# Patient Record
Sex: MALE | Employment: FULL TIME | URBAN - METROPOLITAN AREA
[De-identification: names, ages, dates, MRNs, and addresses within clinical notes are randomized per-mention and may not be internally consistent; named-entity substitution may affect disease eponyms.]

---

## 2017-02-07 PROCEDURE — 87491 CHLMYD TRACH DNA AMP PROBE: CPT | Performed by: FAMILY MEDICINE

## 2017-02-07 PROCEDURE — 87591 N.GONORRHOEAE DNA AMP PROB: CPT | Performed by: FAMILY MEDICINE

## 2020-01-11 ENCOUNTER — HOSPITAL ENCOUNTER (EMERGENCY)
Facility: HOSPITAL | Age: 40
Discharge: HOME | End: 2020-01-11
Attending: EMERGENCY MEDICINE
Payer: COMMERCIAL

## 2020-01-11 VITALS
OXYGEN SATURATION: 97 % | WEIGHT: 160 LBS | TEMPERATURE: 99 F | DIASTOLIC BLOOD PRESSURE: 76 MMHG | RESPIRATION RATE: 17 BRPM | HEIGHT: 65 IN | HEART RATE: 68 BPM | SYSTOLIC BLOOD PRESSURE: 117 MMHG | BODY MASS INDEX: 26.66 KG/M2

## 2020-01-11 DIAGNOSIS — M25.551 RIGHT HIP PAIN: Primary | ICD-10-CM

## 2020-01-11 PROCEDURE — 99283 EMERGENCY DEPT VISIT LOW MDM: CPT

## 2020-01-11 PROCEDURE — 96372 THER/PROPH/DIAG INJ SC/IM: CPT

## 2020-01-11 RX ORDER — KETOROLAC TROMETHAMINE 10 MG/1
10 TABLET, FILM COATED ORAL EVERY 6 HOURS PRN
Qty: 30 TABLET | Refills: 0 | Status: SHIPPED | OUTPATIENT
Start: 2020-01-11 | End: 2020-01-18

## 2020-01-11 RX ORDER — KETOROLAC TROMETHAMINE 30 MG/ML
60 INJECTION, SOLUTION INTRAMUSCULAR; INTRAVENOUS ONCE
Status: COMPLETED | OUTPATIENT
Start: 2020-01-11 | End: 2020-01-11

## 2020-01-12 NOTE — ED INITIAL ASSESSMENT (HPI)
Onset Thursday with right hip pain, worse last night. Went to  urgent care, had XRAY completed, diagnosed with trochanteric bursitis. Pain is progressive, no relief with motrin.

## 2020-01-12 NOTE — ED PROVIDER NOTES
Patient Seen in: [facility] Emergency Department      History   Patient presents with:  Lower Extremity Injury    Stated Complaint: right hip pain/strain    HPI    Patient is a 13-year-old gentleman coming in with right hip pain.   Started on Wednesd Pulse 68   Temp 98.7 °F (37.1 °C) (Temporal)   Resp 17   Ht 165.1 cm (5' 5\")   Wt 72.6 kg   SpO2 97%   BMI 26.63 kg/m²         Physical Exam  Vitals signs and nursing note reviewed. Constitutional:       General: He is not in acute distress.      Yaa Jimenez available. FINDINGS/  IMPRESSION:    No acute fracture. No malalignment. No osteoarthritis.     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

## 2021-11-26 ENCOUNTER — APPOINTMENT (OUTPATIENT)
Dept: GENERAL RADIOLOGY | Age: 41
End: 2021-11-26
Attending: EMERGENCY MEDICINE
Payer: COMMERCIAL

## 2021-11-26 ENCOUNTER — HOSPITAL ENCOUNTER (OUTPATIENT)
Age: 41
Discharge: HOME | End: 2021-11-26
Attending: EMERGENCY MEDICINE
Payer: COMMERCIAL

## 2021-11-26 VITALS
RESPIRATION RATE: 18 BRPM | OXYGEN SATURATION: 100 % | TEMPERATURE: 98 F | DIASTOLIC BLOOD PRESSURE: 75 MMHG | HEART RATE: 66 BPM | SYSTOLIC BLOOD PRESSURE: 146 MMHG

## 2021-11-26 DIAGNOSIS — M25.511 ACUTE PAIN OF RIGHT SHOULDER: Primary | ICD-10-CM

## 2021-11-26 PROCEDURE — 99213 OFFICE O/P EST LOW 20 MIN: CPT

## 2021-11-26 PROCEDURE — 73030 X-RAY EXAM OF SHOULDER: CPT | Performed by: EMERGENCY MEDICINE

## 2021-11-26 NOTE — ED INITIAL ASSESSMENT (HPI)
Right arm and shoulder pain for about 2 months believed to be from lifting weights. Received Pfizer covid booster 3 days ago and has worse pain. Numbness and tingling to fingers yesterday. Taking Ibuprofen with some relief.

## 2021-11-26 NOTE — ED PROVIDER NOTES
Patient Seen in: [facility]      History   Patient presents with:  Arm Pain    Stated Complaint: shoulder pain     Subjective:   HPI    The patient is a 21-year-old male with no significant past medical history presents now with right shoulde Temp src Temporal   SpO2 100 %   O2 Device None (Room air)       Current:/75   Pulse 66   Temp 97.8 °F (36.6 °C) (Temporal)   Resp 18   SpO2 100%         Physical Exam    Constitutional: Well-developed well-nourished in no acute distress  Head: Nor Medication List

## 2023-09-18 ENCOUNTER — NURSE ONLY (OUTPATIENT)
Dept: INTERNAL MEDICINE CLINIC | Facility: HOSPITAL | Age: 43
End: 2023-09-18
Attending: EMERGENCY MEDICINE

## 2023-09-18 DIAGNOSIS — Z00.00 WELLNESS EXAMINATION: Primary | ICD-10-CM

## 2023-09-18 LAB
MUV IGG SER IA-ACNC: 53.7 AU/ML (ref 11–?)
VZV IGG SER IA-ACNC: 668.5 (ref 165–?)

## 2023-09-18 PROCEDURE — 86735 MUMPS ANTIBODY: CPT

## 2023-09-18 PROCEDURE — 86480 TB TEST CELL IMMUN MEASURE: CPT

## 2023-09-18 PROCEDURE — 86765 RUBEOLA ANTIBODY: CPT

## 2023-09-18 PROCEDURE — 86787 VARICELLA-ZOSTER ANTIBODY: CPT

## 2023-09-19 LAB
M TB IFN-G CD4+ T-CELLS BLD-ACNC: 0.04 IU/ML
M TB TUBERC IFN-G BLD QL: NEGATIVE
M TB TUBERC IGNF/MITOGEN IGNF CONTROL: >10 IU/ML
QFT TB1 AG MINUS NIL: 0.13 IU/ML
QFT TB2 AG MINUS NIL: 0.07 IU/ML

## 2024-02-24 ENCOUNTER — HOSPITAL ENCOUNTER (OUTPATIENT)
Age: 44
Discharge: HOME | End: 2024-02-24
Payer: COMMERCIAL

## 2024-02-24 ENCOUNTER — APPOINTMENT (OUTPATIENT)
Dept: GENERAL RADIOLOGY | Age: 44
End: 2024-02-24
Attending: PHYSICIAN ASSISTANT
Payer: COMMERCIAL

## 2024-02-24 VITALS
BODY MASS INDEX: 28 KG/M2 | TEMPERATURE: 97 F | WEIGHT: 165 LBS | SYSTOLIC BLOOD PRESSURE: 124 MMHG | OXYGEN SATURATION: 97 % | RESPIRATION RATE: 18 BRPM | HEART RATE: 68 BPM | DIASTOLIC BLOOD PRESSURE: 55 MMHG

## 2024-02-24 DIAGNOSIS — M79.672 PAIN OF LEFT HEEL: Primary | ICD-10-CM

## 2024-02-24 DIAGNOSIS — M76.62 ACHILLES TENDINITIS OF LEFT LOWER EXTREMITY: ICD-10-CM

## 2024-02-24 PROCEDURE — 73610 X-RAY EXAM OF ANKLE: CPT | Performed by: PHYSICIAN ASSISTANT

## 2024-02-24 PROCEDURE — 99213 OFFICE O/P EST LOW 20 MIN: CPT | Performed by: PHYSICIAN ASSISTANT

## 2024-02-24 NOTE — ED PROVIDER NOTES
Patient Seen in: [facility]      History     Chief Complaint   Patient presents with    Heel Pain     Stated Complaint: heel pain    Subjective:   HPI  Srinivas Angel is 43 year old male presents with acute left heel pain x 3 day due to injury while performing squats at the gym .  Patient reports pain localized to left heel at the insertion point of the achilles region.  Non radiating, worsened w/ ambulation, weight bearing, and palpation.  No numbness, tingling, parasthesias, skin/ color/ temperature/ sensory changes reported.  No medications taken prior to arrival.           Objective:   History reviewed. No pertinent past medical history.           History reviewed. No pertinent surgical history.             Social History     Socioeconomic History    Marital status:     Number of children: 3   Tobacco Use    Smoking status: Former     Packs/day: 0.10     Years: 5.00     Additional pack years: 0.00     Total pack years: 0.50     Types: Cigarettes     Quit date: 2016     Years since quittin.0    Smokeless tobacco: Never   Vaping Use    Vaping Use: Never used   Substance and Sexual Activity    Alcohol use: Yes     Comment: occ    Drug use: Never    Sexual activity: Yes     Partners: Female   Other Topics Concern    Exercise Yes    Seat Belt Yes   Social History Narrative    Ages 6/4/2 , children              Review of Systems   All other systems reviewed and are negative.      Positive for stated complaint: heel pain  Other systems are as noted in HPI.  Constitutional and vital signs reviewed.      All other systems reviewed and negative except as noted above.    Physical Exam     ED Triage Vitals [24 0830]   /55   Pulse 68   Resp 18   Temp 97.3 °F (36.3 °C)   Temp src Temporal   SpO2 97 %   O2 Device None (Room air)       Current:/55   Pulse 68   Temp 97.3 °F (36.3 °C) (Temporal)   Resp 18   Wt 74.8 kg   SpO2 97%   BMI 28.32 kg/m²         Physical  Exam  Vitals and nursing note reviewed.   Constitutional:       General: He is not in acute distress.     Appearance: Normal appearance. He is normal weight. He is not ill-appearing, toxic-appearing or diaphoretic.   HENT:      Head: Normocephalic and atraumatic.      Nose: Nose normal.   Eyes:      Extraocular Movements: Extraocular movements intact.      Pupils: Pupils are equal, round, and reactive to light.   Cardiovascular:      Rate and Rhythm: Normal rate.      Pulses: Normal pulses.   Pulmonary:      Effort: Pulmonary effort is normal.      Breath sounds: Normal breath sounds.   Musculoskeletal:         General: Tenderness present. No swelling, deformity or signs of injury. Normal range of motion.      Cervical back: Normal range of motion and neck supple.      Right lower leg: No edema.      Left lower leg: No edema.      Comments: Negative Rizvi test    Skin:     General: Skin is warm and dry.      Capillary Refill: Capillary refill takes less than 2 seconds.      Coloration: Skin is not jaundiced or pale.      Findings: No bruising, erythema, lesion or rash.   Neurological:      General: No focal deficit present.      Mental Status: He is alert and oriented to person, place, and time. Mental status is at baseline.   Psychiatric:         Mood and Affect: Mood normal.         Behavior: Behavior normal.         Thought Content: Thought content normal.         Judgment: Judgment normal.               ED Course   Labs Reviewed - No data to display  XR ANKLE (MIN 3 VIEWS), LEFT (CPT=73610)   Final Result   PROCEDURE:  XR ANKLE (MIN 3 VIEWS), LEFT (CPT=73610)       TECHNIQUE:  Three views were obtained.       COMPARISON:  None.       INDICATIONS:  heel pain.  rule out avulsion fracture       PATIENT STATED HISTORY: (As transcribed by Technologist)  Pt with PMH of    plantar fasciatus, was doing squats with weights at the gym , and started    to feel pain at the heel of his left foot, it has progressively  gotten    worse                              =====   CONCLUSION:  There is no acute fracture or dislocation involving the left    ankle.           LOCATION:  [facility]           Dictated by (CST): Kathleen Sanders MD on 2/24/2024 at 9:02 AM        Finalized by (CST): Kathleen Sanders MD on 2/24/2024 at 9:02 AM           Medications - No data to display  Vitals:    02/24/24 0830   BP: 124/55   Pulse: 68   Resp: 18   Temp: 97.3 °F (36.3 °C)                      MDM                                        Medical Decision Making  43-year-old well-appearing male presents with Achilles tendinitis localized to insertion point of left lower extremity.  No concern for Achilles rupture as there are no palpable deficits along the Achilles region with dorsiflexion of left foot.  Negative Rizvi's test.  Plan  - x ray: left ankle  - OTC: Tylenol 1g po q 6 hours/ prn.  ibuprofen 600mg po q8 hours/ prn.   - RICE Apply a compressive ACE bandage. Rest and elevate the affected painful area. Apply cold compresses intermittently as needed. As pain recedes, begin normal activities slowly as tolerated. Call if symptoms persist.  - explained to patient that if symptoms do not improve that an MRI may be warranted however that will be determined at the discretion of follow up care  - refer to orthopaedics/ PCP  as needed     Amount and/or Complexity of Data Reviewed  Radiology: ordered and independent interpretation performed. Decision-making details documented in ED Course.     Details: No fracture/dislocation of the left ankle based on my independent interpretation        Disposition and Plan     Clinical Impression:  1. Pain of left heel    2. Achilles tendinitis of left lower extremity         Disposition:  Discharge  2/24/2024  9:13 am    Follow-up:  Marianne Licea MD  [address] [place] [id number]  [phone]                Medications Prescribed:  Discharge Medication List as of 2/24/2024  9:15 AM

## 2024-02-24 NOTE — ED INITIAL ASSESSMENT (HPI)
Pt with PMH of plantar fasciatus, was doing squats with weights at the gym , and started to feel pain at the heel of his left foot, it has progressively gotten worse

## 2024-02-29 ENCOUNTER — OFFICE VISIT (OUTPATIENT)
Dept: FAMILY MEDICINE CLINIC | Facility: CLINIC | Age: 44
End: 2024-02-29
Payer: COMMERCIAL

## 2024-02-29 VITALS
RESPIRATION RATE: 20 BRPM | HEIGHT: 64 IN | TEMPERATURE: 97 F | BODY MASS INDEX: 28.17 KG/M2 | SYSTOLIC BLOOD PRESSURE: 102 MMHG | WEIGHT: 165 LBS | DIASTOLIC BLOOD PRESSURE: 58 MMHG | HEART RATE: 72 BPM | OXYGEN SATURATION: 98 %

## 2024-02-29 DIAGNOSIS — J01.00 ACUTE NON-RECURRENT MAXILLARY SINUSITIS: Primary | ICD-10-CM

## 2024-02-29 DIAGNOSIS — Z00.00 HEALTHCARE MAINTENANCE: ICD-10-CM

## 2024-02-29 PROCEDURE — 99214 OFFICE O/P EST MOD 30 MIN: CPT | Performed by: FAMILY MEDICINE

## 2024-02-29 RX ORDER — DOXYCYCLINE 100 MG/1
100 CAPSULE ORAL 2 TIMES DAILY
Qty: 14 CAPSULE | Refills: 0 | Status: SHIPPED | OUTPATIENT
Start: 2024-02-29 | End: 2024-03-07

## 2024-02-29 RX ORDER — BENZONATATE 200 MG/1
200 CAPSULE ORAL 3 TIMES DAILY PRN
Qty: 40 CAPSULE | Refills: 0 | Status: SHIPPED | OUTPATIENT
Start: 2024-02-29

## 2024-02-29 NOTE — PROGRESS NOTES
[facility] Office Note  Chief Complaint:   Chief Complaint   Patient presents with    Cough     Pt c/o sx for about 3 weeks.   Pt sts coughing up yellow mucus.    Chest Congestion    Nasal Congestion       HPI:   This is a 43 year old male coming in for cough congestion sinus pressure the patient states that this has been going on for the last 3 weeks states he has had some yellow mucus production as well.  Denies any fever denies any shortness of breath.      No past medical history on file.  No past surgical history on file.  Social History:  Social History     Socioeconomic History    Marital status:     Number of children: 3   Tobacco Use    Smoking status: Former     Packs/day: 0.10     Years: 5.00     Additional pack years: 0.00     Total pack years: 0.50     Types: Cigarettes     Quit date: 2016     Years since quittin.0    Smokeless tobacco: Never   Vaping Use    Vaping Use: Never used   Substance and Sexual Activity    Alcohol use: Yes     Comment: occ    Drug use: Never    Sexual activity: Yes     Partners: Female   Other Topics Concern    Exercise Yes    Seat Belt Yes   Social History Narrative    Ages 6/4/2 , children     Family History:  Family History   Problem Relation Age of Onset    Diabetes Mother     Cancer Neg     Heart Attack Neg     Stroke Neg      Allergies:  Allergies   Allergen Reactions    Penicillins SHORTNESS OF BREATH     As a child reaction unknown       Current Meds:  Current Outpatient Medications   Medication Sig Dispense Refill    Doxycycline Monohydrate 100 MG Oral Cap Take 1 capsule (100 mg total) by mouth 2 (two) times daily for 7 days. 14 capsule 0    benzonatate 200 MG Oral Cap Take 1 capsule (200 mg total) by mouth 3 (three) times daily as needed for cough. 40 capsule 0    Multiple Vitamins-Minerals (MULTIVITAMIN MEN OR) Take 1 tablet by mouth daily.        Counseling given: Not Answered       REVIEW OF SYSTEMS:   Consitutional: No  fevers, chills, sweats  Eye: No recent visual problems  ENMT: No ear pain positive nasal congestion sore throat  Respiratory: No shortness of breath, positive cough  Cardiovascular: No chest pain palpitations syncope  Gastrointestinal: No nausea vomiting diarrhea  Genitourinary: No hematuria  Hema/Lymph no bruising tendency, swollen lymph glands  Endocrine: Negative for excessive thirst excessive hunger      Medical, surgical, family, and social histories were reviewed      EXAM:   VITALS:   Vitals:    02/29/24 0731   BP: 102/58   Pulse: 72   Resp: 20   Temp: 97 °F (36.1 °C)      GENERAL: well developed, well nourished, in no apparent distress  SKIN: no rashes, no suspicious lesions: Cool and Dry  HEENT: atraumatic, normocephalic, ears and throat are clear right and left maxillary sinus tenderness to palpation  Ears: TM's clear and visible bilaterally, no excess cerumen or erythema.   EYES: Pupils equal round and reactive.  Extraocular motions intact no scleral icterus no injection or drainage  THROAT with erythema 1+ tonsillar hypertrophy no exudate.  Uvula midline airway patent  NECK: Given midline.  No JVD or lymphadenopathy supple nontender no meningeal signs   LUNGS: clear to auscultation sounds equal bilaterally no wheezes rales or rhonchi  CARDIO: Regular rate and rhythm without murmurs gallops or rubs         ASSESSMENT AND PLAN:   1. Acute non-recurrent maxillary sinusitis  - Doxycycline Monohydrate 100 MG Oral Cap; Take 1 capsule (100 mg total) by mouth 2 (two) times daily for 7 days.  Dispense: 14 capsule; Refill: 0  - benzonatate 200 MG Oral Cap; Take 1 capsule (200 mg total) by mouth 3 (three) times daily as needed for cough.  Dispense: 40 capsule; Refill: 0    I did discuss with the patient at this time we will be starting him on doxycycline he was asked to use this twice a day for the next 7 days he was asked to use Tessalon Perles to help with the cough.  He was asked to use Flonase twice a day and  to follow-up if he has any continued symptoms        2. Healthcare maintenance  - CBC With Differential With Platelet; Future  - Comp Metabolic Panel (14); Future  - Lipid Panel; Future  - PSA Total, Screen; Future  - TSH and Free T4; Future  - Triiodothyronine (T3) Total; Future  I did discuss with the patient he need to update blood work you need a CBC CMP free T4 free T3 TSH as well as lipid panel PSA he was also asked to schedule a physical exam so that we could go over the results.    Meds & Refills for this Visit:  Requested Prescriptions     Signed Prescriptions Disp Refills    Doxycycline Monohydrate 100 MG Oral Cap 14 capsule 0     Sig: Take 1 capsule (100 mg total) by mouth 2 (two) times daily for 7 days.    benzonatate 200 MG Oral Cap 40 capsule 0     Sig: Take 1 capsule (200 mg total) by mouth 3 (three) times daily as needed for cough.       Health Maintenance:  Health Maintenance Due   Topic Date Due    Annual Physical  Never done    COVID-19 Vaccine (4 - 2023-24 season) 09/01/2023    Influenza Vaccine (1) 10/01/2023    Annual Depression Screening  01/01/2024       Patient/Caregiver Education: Patient/Caregiver Education: There are no barriers to learning. Medical education done.   Outcome: Patient verbalizes understanding. Patient is notified to call with any questions, complications, allergies, or worsening or changing symptoms.  Patient is to call with any side effects or complications from the treatments as a result of today.     Problem List:  There is no problem list on file for this patient.        No follow-ups on file.     Jose J Stokes MD    Please note that portions of this note may have been completed with a voice recognition program. Efforts were made to edit the dictations but occasionally words are mis-transcribed.

## 2024-03-06 ENCOUNTER — LAB ENCOUNTER (OUTPATIENT)
Dept: LAB | Age: 44
End: 2024-03-06
Attending: FAMILY MEDICINE
Payer: COMMERCIAL

## 2024-03-06 DIAGNOSIS — Z00.00 HEALTHCARE MAINTENANCE: ICD-10-CM

## 2024-03-06 LAB
ALBUMIN SERPL-MCNC: 3.6 G/DL (ref 3.4–5)
ALBUMIN/GLOB SERPL: 0.9 {RATIO} (ref 1–2)
ALP LIVER SERPL-CCNC: 64 U/L
ALT SERPL-CCNC: 47 U/L
ANION GAP SERPL CALC-SCNC: 5 MMOL/L (ref 0–18)
AST SERPL-CCNC: 17 U/L (ref 15–37)
BASOPHILS # BLD AUTO: 0.07 X10(3) UL (ref 0–0.2)
BASOPHILS NFR BLD AUTO: 1.2 %
BILIRUB SERPL-MCNC: 0.5 MG/DL (ref 0.1–2)
BUN BLD-MCNC: 18 MG/DL (ref 9–23)
CALCIUM BLD-MCNC: 8.8 MG/DL (ref 8.5–10.1)
CHLORIDE SERPL-SCNC: 104 MMOL/L (ref 98–112)
CHOLEST SERPL-MCNC: 170 MG/DL (ref ?–200)
CO2 SERPL-SCNC: 26 MMOL/L (ref 21–32)
COMPLEXED PSA SERPL-MCNC: 0.83 NG/ML (ref ?–4)
CREAT BLD-MCNC: 0.95 MG/DL
EGFRCR SERPLBLD CKD-EPI 2021: 102 ML/MIN/1.73M2 (ref 60–?)
EOSINOPHIL # BLD AUTO: 0.17 X10(3) UL (ref 0–0.7)
EOSINOPHIL NFR BLD AUTO: 2.9 %
ERYTHROCYTE [DISTWIDTH] IN BLOOD BY AUTOMATED COUNT: 13.2 %
FASTING PATIENT LIPID ANSWER: YES
FASTING STATUS PATIENT QL REPORTED: YES
GLOBULIN PLAS-MCNC: 4.2 G/DL (ref 2.8–4.4)
GLUCOSE BLD-MCNC: 105 MG/DL (ref 70–99)
HCT VFR BLD AUTO: 44.7 %
HDLC SERPL-MCNC: 33 MG/DL (ref 40–59)
HGB BLD-MCNC: 14.1 G/DL
IMM GRANULOCYTES # BLD AUTO: 0.09 X10(3) UL (ref 0–1)
IMM GRANULOCYTES NFR BLD: 1.5 %
LDLC SERPL CALC-MCNC: 119 MG/DL (ref ?–100)
LYMPHOCYTES # BLD AUTO: 1.75 X10(3) UL (ref 1–4)
LYMPHOCYTES NFR BLD AUTO: 29.8 %
MCH RBC QN AUTO: 26 PG (ref 26–34)
MCHC RBC AUTO-ENTMCNC: 31.5 G/DL (ref 31–37)
MCV RBC AUTO: 82.5 FL
MONOCYTES # BLD AUTO: 0.93 X10(3) UL (ref 0.1–1)
MONOCYTES NFR BLD AUTO: 15.8 %
NEUTROPHILS # BLD AUTO: 2.86 X10 (3) UL (ref 1.5–7.7)
NEUTROPHILS # BLD AUTO: 2.86 X10(3) UL (ref 1.5–7.7)
NEUTROPHILS NFR BLD AUTO: 48.8 %
NONHDLC SERPL-MCNC: 137 MG/DL (ref ?–130)
OSMOLALITY SERPL CALC.SUM OF ELEC: 282 MOSM/KG (ref 275–295)
PLATELET # BLD AUTO: 301 10(3)UL (ref 150–450)
POTASSIUM SERPL-SCNC: 4.3 MMOL/L (ref 3.5–5.1)
PROT SERPL-MCNC: 7.8 G/DL (ref 6.4–8.2)
RBC # BLD AUTO: 5.42 X10(6)UL
SODIUM SERPL-SCNC: 135 MMOL/L (ref 136–145)
T3 SERPL-MCNC: 100 NG/DL (ref 60–181)
T4 FREE SERPL-MCNC: 1.3 NG/DL (ref 0.8–1.7)
TRIGL SERPL-MCNC: 96 MG/DL (ref 30–149)
TSI SER-ACNC: 1.12 MIU/ML (ref 0.36–3.74)
VLDLC SERPL CALC-MCNC: 17 MG/DL (ref 0–30)
WBC # BLD AUTO: 5.9 X10(3) UL (ref 4–11)

## 2024-03-06 PROCEDURE — 85025 COMPLETE CBC W/AUTO DIFF WBC: CPT

## 2024-03-06 PROCEDURE — 80061 LIPID PANEL: CPT

## 2024-03-06 PROCEDURE — 84480 ASSAY TRIIODOTHYRONINE (T3): CPT

## 2024-03-06 PROCEDURE — 84443 ASSAY THYROID STIM HORMONE: CPT

## 2024-03-06 PROCEDURE — 80053 COMPREHEN METABOLIC PANEL: CPT

## 2024-03-06 PROCEDURE — 84439 ASSAY OF FREE THYROXINE: CPT

## 2024-03-07 DIAGNOSIS — E78.00 ELEVATED LDL CHOLESTEROL LEVEL: Primary | ICD-10-CM

## 2024-03-13 ENCOUNTER — OFFICE VISIT (OUTPATIENT)
Dept: FAMILY MEDICINE CLINIC | Facility: CLINIC | Age: 44
End: 2024-03-13
Payer: COMMERCIAL

## 2024-03-13 VITALS
HEART RATE: 76 BPM | DIASTOLIC BLOOD PRESSURE: 56 MMHG | BODY MASS INDEX: 28.17 KG/M2 | WEIGHT: 165 LBS | RESPIRATION RATE: 18 BRPM | HEIGHT: 64 IN | SYSTOLIC BLOOD PRESSURE: 102 MMHG | TEMPERATURE: 98 F

## 2024-03-13 DIAGNOSIS — Z00.00 HEALTHCARE MAINTENANCE: Primary | ICD-10-CM

## 2024-03-13 NOTE — PROGRESS NOTES
[facility] Family Medicine Office Note  Chief Complaint:   Chief Complaint   Patient presents with    Physical       HPI:   This is a 43 year old male coming in for physical exam.  The patient denies any acute concerns today denies any chest pain nausea vomiting diarrhea constipation.      No past medical history on file.  No past surgical history on file.  Social History:  Social History     Socioeconomic History    Marital status:     Number of children: 3   Tobacco Use    Smoking status: Former     Packs/day: 0.10     Years: 5.00     Additional pack years: 0.00     Total pack years: 0.50     Types: Cigarettes     Quit date: 2016     Years since quittin.1    Smokeless tobacco: Never   Vaping Use    Vaping Use: Never used   Substance and Sexual Activity    Alcohol use: Yes     Comment: occ    Drug use: Never    Sexual activity: Yes     Partners: Female   Other Topics Concern    Caffeine Concern No    Exercise Yes     Comment: 4-5 times a week    Seat Belt Yes   Social History Narrative    Ages 6/4/2 , children     Family History:  Family History   Problem Relation Age of Onset    Diabetes Mother     Cancer Neg     Heart Attack Neg     Stroke Neg      Allergies:  Allergies   Allergen Reactions    Penicillins SHORTNESS OF BREATH     As a child reaction unknown       Current Meds:  Current Outpatient Medications   Medication Sig Dispense Refill    benzonatate 200 MG Oral Cap Take 1 capsule (200 mg total) by mouth 3 (three) times daily as needed for cough. (Patient not taking: Reported on 3/13/2024) 40 capsule 0    Multiple Vitamins-Minerals (MULTIVITAMIN MEN OR) Take 1 tablet by mouth daily.        Counseling given: Not Answered       REVIEW OF SYSTEMS:   Consitutional: No fevers, chills, sweats  Eye: No recent visual problems  ENMT: No ear pain nasal congestion sore throat  Respiratory: No shortness of breath, cough  Cardiovascular: No chest pain palpitations syncope  Gastrointestinal: No  nausea vomiting diarrhea  Genitourinary: No hematuria  Hema/Lymph no bruising tendency, swollen lymph glands  Endocrine: Negative for excessive thirst excessive hunger  Musculoskeletal: No back pain neck pain joint pain muscle pain decreased range of motion  Integumentary: No rash, pruritus, abrasions  Neurologic: Alert and oriented x4  Psychiatric: No anxiety, depression    Medical, surgical, family, and social histories were reviewed      EXAM:   VITALS:   Vitals:    03/13/24 0707   BP: 102/56   Pulse: 76   Resp: 18   Temp: 97.6 °F (36.4 °C)      GENERAL: well developed, well nourished, in no apparent distress  SKIN: no rashes, no suspicious lesions: Cool and Dry  HEENT: atraumatic, normocephalic, ears and throat are clear    Ears: TM's clear and visible bilaterally, no excess cerumen or erythema.   EYES: Pupils equal round and reactive.  Extraocular motions intact no scleral icterus no injection or drainage  THROAT without erythema tonsillar hypertrophy or exudate.  Uvula midline airway patent  NECK: Given midline.  No JVD or lymphadenopathy supple nontender no meningeal signs   LUNGS: clear to auscultation sounds equal bilaterally no wheezes rales or rhonchi  CARDIO: Regular rate and rhythm without murmurs gallops or rubs  GI: Soft nontender nondistended no hepatomegaly palpable masses.  No guarding.   without deformity or crepitus no flank tenderness  EXTREMITIES: no cyanosis, clubbing or edema no joint tenderness effusion or edema noted.  No calf tenderness negative Homans' sign bilaterally  NEURO: Awake and alert.  Cranial nerves II through XII intact motor and sensory grossly within normal limits.  5 out of 5 muscle strength in all muscle groups.  Normal speech.  PSYCHIATRIC: Awake, alert and oriented x3, cooperative appropriate mood and affect.  Judgment intact       ASSESSMENT AND PLAN:   1. Healthcare maintenance  The patient is up-to-date with all preventative measures immunizations are  up-to-date he was asked to follow-up yearly for regular physical exams.  We also did discuss updating blood work in the next 3 months you need a fasting lipid panel as well as a CMP.  He was asked to watch his diet.    Meds & Refills for this Visit:  Requested Prescriptions      No prescriptions requested or ordered in this encounter       Health Maintenance:  Health Maintenance Due   Topic Date Due    Annual Physical  Never done    COVID-19 Vaccine (4 - 2023-24 season) 09/01/2023    Influenza Vaccine (1) 10/01/2023       Patient/Caregiver Education: Patient/Caregiver Education: There are no barriers to learning. Medical education done.   Outcome: Patient verbalizes understanding. Patient is notified to call with any questions, complications, allergies, or worsening or changing symptoms.  Patient is to call with any side effects or complications from the treatments as a result of today.     Problem List:  There is no problem list on file for this patient.        No follow-ups on file.     Jose J Stokes MD    Please note that portions of this note may have been completed with a voice recognition program. Efforts were made to edit the dictations but occasionally words are mis-transcribed.

## 2024-07-26 ENCOUNTER — TELEPHONE (OUTPATIENT)
Dept: INTERNAL MEDICINE CLINIC | Facility: HOSPITAL | Age: 44
End: 2024-07-26

## 2024-07-26 DIAGNOSIS — Z20.822 SUSPECTED COVID-19 VIRUS INFECTION: Primary | ICD-10-CM

## 2024-07-26 NOTE — TELEPHONE ENCOUNTER
[x] EH  []FAVIOLA   [] [place]  Manager : Joaquin Pierre    [] Direct Patient Care  []Indirect Patient Contact   [x] Non-Clinical/No Patient Contact    High Risk Area:    [] Yes   [x] No    For Direct Patient Care ONLY: Have you been fitted with an N95 mask? [] Yes  [x]No      HAVE YOU RECEIVED THE COVID-19 Vaccine? Yes [x]    No []          If yes, date(s) received:  01/16/2021 02/06/2021 11/23/2021          Which vaccine:  Pfizer [x]     Moderna []    J&J []      SYMPTOMS (reported via dashboard):  [] asymptomatic  [x] symptomatic  [] GI symptoms only    Symptom onset date: 07/22/2024  Fever   > 100F             Yes []      Cough                          Yes [x]      Shortness of breath  Yes []      Congestion                 Yes []      Runny nose                Yes []        Loss of Smell              Yes []        Loss of Taste             Yes []       Sore throat                 Yes []       Fatigue                        Yes []       Body Aches                Yes []        Chills                           Yes []        Headache                   Yes []             GI symptoms             Yes []     No [x]                     Nausea   []          Vomiting            []                                    Diarrhea  []          Upset stomach []      Employee reported COVID Exposure?  Yes [x]     No []    Date of exposure: 07/18/2024  []  Coworker                       [] patient                        [x] Family/friend    PPE:   [] N95 Mask/PAPR  [] Standard Mask  [] Eyewear  [x] None    Within 6 feet for >15 minutes? [x] Yes []  No    Is this a true exposure? [x]  Yes []  No    When was the last shift you worked?: 07/19/2024    Employee has a history of Covid?  Yes []     No [x]   If Yes, when:    PLAN:     COVID-19 testing ordered:   [x] Rapid      [] [facility]              Date test is to be taken:   07/26/2024    []  No testing required at this time  []  Outside testing                           Notes:    INSTRUCTIONS  PROVIDED:    [x]  Employee was instructed to call Central scheduling at [phone] or use [facility] to make an appointment for their testing   [x]  May return to work if employee views negative result in MyChart and remains fever, vomiting, and diarrhea free  []  May continue to work if remains asymptomatic and views negative result in MyChart  []  Follow up for condition update when resulting  []  If symptoms develop, stay home and call hotline for rapid test order  []  If COVID positive results, off work minimum of 5 days from positive test or onset of symptoms (day 0)     [x]  Plan noted above  []  Length of time to obtain results  []  Quarantine instructions  []  S/S of worsening infection/condition and importance of prompt medical re-evaluation including when to seek emergency care.   [] The employee voiced understanding

## 2024-07-29 NOTE — TELEPHONE ENCOUNTER
Outside Covid Testing done    Results and RTW guidelines:    COVID RESULT reported:      Test type:    [] Rapid outside         [] PCR outside       [x] Home Test    Date of test: 7/22     Test location: home         [] Result viewed in Epic with verbal consent received from employee     [] Results per Employee Covid Dashboard    [x] Employee instructed to email copy of result to [email or web address]       [x] Discussed with employee     [] Unable to reach by phone.  Sent via [employer] message    [x] Positive    - Employee should quarantine at home for at least 5 days (day 1 is day after sx onset) , follow the CDC guidelines for cleaning and                              quarantining; see CDC.gov   -This employee may RTW on day 6 if asymptomatic or mildly symptomatic (with improving symptoms).  Call Employee Health on day 5 if unable to return on                      day 6 after symptom onset.    -This employee needs to call Employee Health on day 5 after symptom onset.  The employee needs to be cleared by Employee Health.  - If Employee is still experiencing severe symptoms must make a RTW appt with Employee Health, Employee will not be cleared if:    1. Has consistent cough, shortness of breath or fatigue that restricts your physical activities    2. Is still feeling \"unwell\"    3. Within 15 days of hospitalization for COVID    4. Within 20 days of intubation for COVID    5. Still has a fever, vomiting or diarrhea   - Keep communication open with management about RTW and if symptoms worsen    - Monitor sx and temperature    - Employee should quarantine at home for at least 5 days from sx onset, follow the CDC guidelines for cleaning and quarantining; see CDC.gov                  - Notify PCP of result                 - Seek emergent care with worsening symptoms        Notes:     RTW PLAN:    [x]  If COVID positive results, off work minimum of 5 days from positive test or onset of symptoms (day 0)        On day  5, if asymptomatic or mildly symptomatic (with improving symptoms) may return to work day 6          On day 5, if symptomatic, call Employee Health for RTW screening        []  COVID positive result - call Employee Health on day 5 after symptom onset.  The employee needs to be cleared by Employee Health to RTW.  [] RTW immediately, continue to monitor for sx  [] RTW when sx improve; must be fever free for 24 hours w/o medications, Diarrhea/Vomiting free for 24 hours w/o medications  [] [facility] ordered; continue to monitor sx and call for new/worsening sx.  Discuss RTW guidelines with manager             [] Rapid ordered to confirm home negative.   [] May continue to work  [] Follow up with PCP  [] Home until further instruction from hotline with Al[facility] results  INSTRUCTIONS PROVIDED:  [x]  Plan as noted above  []  Length of time to obtain results  []  May return to work if views negative in My Chart and  remains fever, vomiting, and diarrhea free  []  May continue to work if remains asymptomatic   [x]  Quarantine instructions  [x]  Masking protocol  [x]  S/S of worsening infection/condition and importance of prompt medical re-evaluation including when to seek emergency care  [] If symptoms develop, stay home and call hotline for rapid test order    Estimated RTW date:  7/28  [x] Manager notified        [x] ELVIS  []FAVIOLA   [] [facility]  Manager : Joaquin Pierre    [] Direct Patient Care  []Indirect Patient Contact   [x] Non-Clinical/No Patient Contact    High Risk Area:    [] Yes   [] No    For Direct Patient Care ONLY: Have you been fitted with an N95 mask? [] Yes  []No      HAVE YOU RECEIVED THE COVID-19 Vaccine? Yes [x]    No []          If yes, date(s) received:   1/16/21; 2/6/21; 11/23/21         Which vaccine:  Pfizer [x]     Moderna []    J&J []      SYMPTOMS (reported via dashboard):  [] asymptomatic  [x] symptomatic  [] GI symptoms only    Symptom onset date: 7/22    Fever   > 100F             Yes []      Cough                           Yes [x]      Shortness of breath  Yes []      Congestion                 Yes []      Runny nose                Yes []        Loss of Smell              Yes []        Loss of Taste             Yes []       Sore throat                 Yes []       Fatigue                        Yes []       Body Aches                Yes []        Chills                           Yes []        Headache                   Yes []             GI symptoms             Yes []     No [x]                     Nausea   []          Vomiting            []                                    Diarrhea  []          Upset stomach []      Employee reported COVID Exposure?  Yes [x]     No []    Date of exposure:  7/18  []  Coworker                       [] patient                        [x] Family/friend    PPE:   [] N95 Mask/PAPR  [] Standard Mask  [] Eyewear  [x] None    Within 6 feet for >15 minutes? [x] Yes []  No    Is this a true exposure? [x]  Yes []  No    When was the last shift you worked?: 7/19    Employee has a history of Covid?  Yes []     No [x]   If Yes, when:    Notes:

## (undated) NOTE — ED AVS SNAPSHOT
Emil Pearl   MRN: [id number]    Department:  [facility] Emergency Department   Date of Visit:  1/11/2020           Disclosure     Insurance plans vary and the physician(s) referred by the ER may not be covered by your plan.  Please contact your i tell this physician (or your personal doctor if your instructions are to return to your personal doctor) about any new or lasting problems. The primary care or specialist physician will see patients referred from the [facility] Emergency Department.  Andrew Bhagat